# Patient Record
Sex: FEMALE | Race: WHITE | Employment: OTHER | URBAN - METROPOLITAN AREA
[De-identification: names, ages, dates, MRNs, and addresses within clinical notes are randomized per-mention and may not be internally consistent; named-entity substitution may affect disease eponyms.]

---

## 2021-12-21 NOTE — PROGRESS NOTES
HPI: Bora Ferguson (: 1955) is a 78 y.o. female, patient, here for evaluation of the following chief complaint(s): Patient seen today to evaluate her hands. He has developed catching, triggering and locking involving the left thumb over the last couple weeks. 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 She complains of some tingling in her fingers and toes but now is complaining of numbness, tingling and paresthesias that awaken her from her sleep. She underwent bilateral carpal tunnel releases with a left trigger thumb release on 2020. She has done well with that. Overall, she did well from the procedure but still has some mild palmar discomfort. She really does not experience significant nocturnal paresthesias. She does have a component of chronic neuropathy related to breast carcinoma chemotherapy treatment. She has developed catching and locking involving her right trigger thumb and also has a new cyst of the dorsal right hand in the extensor tendon moving with the middle finger. She underwent a right trigger thumb release and dorsal hand ganglion cyst excision from the [facility] tendon to the third finger on 10/12/2021. She presents today with complaint of induration at the ganglion cyst excision surgical site along with radiating discomfort to the forearm and right middle finger. Presents for reassurance that. She has been desensitizing the surgical wound. Hand Pain (Right)       Vitals:  Ht 5' 1\" (1.549 m)   Wt 185 lb (83.9 kg)   BMI 34.96 kg/m²    Body mass index is 34.96 kg/m². Allergies   Allergen Reactions    Morphine Itching       Current Outpatient Medications   Medication Sig    escitalopram oxalate (Lexapro) 10 mg tablet Take 10 mg by mouth daily.  buPROPion XL (WELLBUTRIN XL) 150 mg tablet Take 150 mg by mouth daily.  rosuvastatin (Crestor) 5 mg tablet Take  by mouth nightly.     multivitamin (ONE A DAY) tablet Take 1 Tablet by mouth daily.  cholecalciferol (Vitamin D3) 25 mcg (1,000 unit) cap Take  by mouth daily.  fish oil-omega-3 fatty acids (Fish OiL) 300-500 mg cap Take  by mouth. No current facility-administered medications for this visit. No past medical history on file. No past surgical history on file. No family history on file. Social History     Tobacco Use    Smoking status: Not on file    Smokeless tobacco: Not on file   Substance Use Topics    Alcohol use: Not on file    Drug use: Not on file        Review of Systems    Constitutional: No fevers, chills, night sweats, excessive fatigue or weight loss. Musculoskeletal: No joint pain, swelling or redness. No decreased range of motion. Neurologic: No headache, blurred vision, and no areas of focal weakness or numbness. Normal gait. No sensory problems. Respiratory: No dyspnea on exertion, orthopnea, chest pain, cough or hemoptysis. Cardiovascular: No anginal chest pain, irregular heart beat, tachycardia, palpitations or orthopnea  Integumentary: No chronic rashes, inflammation, ulcerations, pruritus, petechiae, purpura, ecchymoses, or skin changes           Physical Exam    General: Alert, cooperative, no distress  Musculosketal: Right hand - Normal range of motion. Normal sensation. Induration surrounding the surgical wound at the dorsal aspect of the right hand. No erythema or warmth. No sign of infection. Mildly tender. Question scar tissue versus the start of a suture eruption. Neurologic:  CNII-XII intact, Normal strength, sensation, and reflexes throughout    Imaging:  None     ASSESSMENT/PLAN:  Below is the assessment and plan developed based on review of pertinent history, physical exam, labs, studies, and medications. Patient seen today to evaluate her hands. He has developed catching, triggering and locking involving the left thumb over the last couple weeks.   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 She complains of some tingling in her fingers and toes but now is complaining of numbness, tingling and paresthesias that awaken her from her sleep. She underwent bilateral carpal tunnel releases with a left trigger thumb release on 11/9/2020. She has done well with that. Overall, she did well from the procedure but still has some mild palmar discomfort. She really does not experience significant nocturnal paresthesias. She does have a component of chronic neuropathy related to breast carcinoma chemotherapy treatment. She has developed catching and locking involving her right trigger thumb and also has a new cyst of the dorsal right hand in the extensor tendon moving with the middle finger. She underwent a right trigger thumb release and dorsal hand ganglion cyst excision from the [facility] tendon to the third finger on 10/12/2021. She presents today with complaint of induration at the surgical site along with radiating discomfort to the forearm and right middle finger. She presents for reassurance. She has been desensitizing the surgical wound. Patient was reassured the surgical wound is healing well and periodic twinges of discomfort normal at this juncture. The induration may be the start of an eruption of a suture. We discussed the signs and symptoms of a suture abscess. She will continue with gentle range of motion. She will follow-up in 4 weeks to review her progress. Return sooner as needed. 1. Trigger thumb of right hand  2. Right hand pain  3. S/P excision of ganglion cyst  4. Visit for wound check      Return in about 4 weeks (around 1/19/2022). Dr. Elvia Lindsey was available for immediate consult during this encounter. An electronic signature was used to authenticate this note.   -- Samir Brunson PA-C

## 2021-12-22 ENCOUNTER — OFFICE VISIT (OUTPATIENT)
Dept: ORTHOPEDIC SURGERY | Age: 66
End: 2021-12-22
Payer: MEDICARE

## 2021-12-22 VITALS — WEIGHT: 185 LBS | BODY MASS INDEX: 34.93 KG/M2 | HEIGHT: 61 IN

## 2021-12-22 DIAGNOSIS — Z98.890 S/P EXCISION OF GANGLION CYST: ICD-10-CM

## 2021-12-22 DIAGNOSIS — M79.641 RIGHT HAND PAIN: ICD-10-CM

## 2021-12-22 DIAGNOSIS — Z51.89 VISIT FOR WOUND CHECK: ICD-10-CM

## 2021-12-22 DIAGNOSIS — M65.311 TRIGGER THUMB OF RIGHT HAND: Primary | ICD-10-CM

## 2021-12-22 PROCEDURE — 99024 POSTOP FOLLOW-UP VISIT: CPT | Performed by: PHYSICIAN ASSISTANT

## 2021-12-22 RX ORDER — GLUCOSAMINE SULFATE 1500 MG
POWDER IN PACKET (EA) ORAL DAILY
COMMUNITY

## 2021-12-22 RX ORDER — BUPROPION HYDROCHLORIDE 150 MG/1
150 TABLET ORAL DAILY
COMMUNITY

## 2021-12-22 RX ORDER — BISMUTH SUBSALICYLATE 262 MG
1 TABLET,CHEWABLE ORAL DAILY
COMMUNITY

## 2021-12-22 RX ORDER — OMEGA-3 FATTY ACIDS/FISH OIL 300-500 MG
CAPSULE ORAL
COMMUNITY

## 2021-12-22 RX ORDER — ROSUVASTATIN CALCIUM 5 MG/1
TABLET, COATED ORAL
COMMUNITY

## 2021-12-22 RX ORDER — ESCITALOPRAM OXALATE 10 MG/1
10 TABLET ORAL DAILY
COMMUNITY

## 2022-03-18 PROBLEM — Z51.89 VISIT FOR WOUND CHECK: Status: ACTIVE | Noted: 2021-12-22

## 2022-03-19 PROBLEM — M79.641 RIGHT HAND PAIN: Status: ACTIVE | Noted: 2021-12-22

## 2022-03-19 PROBLEM — M65.311 TRIGGER THUMB OF RIGHT HAND: Status: ACTIVE | Noted: 2021-12-22

## 2022-03-19 PROBLEM — Z98.890 S/P EXCISION OF GANGLION CYST: Status: ACTIVE | Noted: 2021-12-22

## 2022-03-22 ENCOUNTER — TRANSCRIBE ORDER (OUTPATIENT)
Dept: SCHEDULING | Age: 67
End: 2022-03-22

## 2022-03-22 DIAGNOSIS — E04.2 NONTOXIC MULTINODULAR GOITER: Primary | ICD-10-CM

## 2022-03-22 DIAGNOSIS — Z13.820 SPECIAL SCREENING FOR OSTEOPOROSIS: ICD-10-CM

## 2022-09-09 ENCOUNTER — OFFICE VISIT (OUTPATIENT)
Dept: ORTHOPEDIC SURGERY | Age: 67
End: 2022-09-09
Payer: MEDICARE

## 2022-09-09 VITALS — BODY MASS INDEX: 33.61 KG/M2 | HEIGHT: 61 IN | WEIGHT: 178 LBS

## 2022-09-09 DIAGNOSIS — M17.11 PRIMARY OSTEOARTHRITIS OF RIGHT KNEE: ICD-10-CM

## 2022-09-09 DIAGNOSIS — M25.561 RIGHT KNEE PAIN, UNSPECIFIED CHRONICITY: Primary | ICD-10-CM

## 2022-09-09 PROCEDURE — G8400 PT W/DXA NO RESULTS DOC: HCPCS | Performed by: ORTHOPAEDIC SURGERY

## 2022-09-09 PROCEDURE — 3017F COLORECTAL CA SCREEN DOC REV: CPT | Performed by: ORTHOPAEDIC SURGERY

## 2022-09-09 PROCEDURE — G8427 DOCREV CUR MEDS BY ELIG CLIN: HCPCS | Performed by: ORTHOPAEDIC SURGERY

## 2022-09-09 PROCEDURE — G8536 NO DOC ELDER MAL SCRN: HCPCS | Performed by: ORTHOPAEDIC SURGERY

## 2022-09-09 PROCEDURE — G8432 DEP SCR NOT DOC, RNG: HCPCS | Performed by: ORTHOPAEDIC SURGERY

## 2022-09-09 PROCEDURE — G8417 CALC BMI ABV UP PARAM F/U: HCPCS | Performed by: ORTHOPAEDIC SURGERY

## 2022-09-09 PROCEDURE — 1090F PRES/ABSN URINE INCON ASSESS: CPT | Performed by: ORTHOPAEDIC SURGERY

## 2022-09-09 PROCEDURE — 1101F PT FALLS ASSESS-DOCD LE1/YR: CPT | Performed by: ORTHOPAEDIC SURGERY

## 2022-09-09 PROCEDURE — 99203 OFFICE O/P NEW LOW 30 MIN: CPT | Performed by: ORTHOPAEDIC SURGERY

## 2022-09-09 PROCEDURE — 1123F ACP DISCUSS/DSCN MKR DOCD: CPT | Performed by: ORTHOPAEDIC SURGERY

## 2022-09-09 NOTE — PROGRESS NOTES
Cinthya Wilkins (: 1955) is a 78 y.o. female, patient, here for evaluation of the following chief complaint(s):  Knee Pain (Right knee pain - received gel injection in the past (last injection 2022))       HPI:    If complaint is right knee pain. Patient is starting to have more limitations with her right knee. She been treated in the past with multiple hyaluronic acid injections they help her. She has had reconstruction of both knees. It is worsening and limitations are increasing. Allergies   Allergen Reactions    Morphine Itching       Current Outpatient Medications   Medication Sig    escitalopram oxalate (LEXAPRO) 10 mg tablet Take 10 mg by mouth daily. buPROPion XL (WELLBUTRIN XL) 150 mg tablet Take 150 mg by mouth daily. rosuvastatin (CRESTOR) 5 mg tablet Take  by mouth nightly. multivitamin (ONE A DAY) tablet Take 1 Tablet by mouth daily. cholecalciferol (VITAMIN D3) 25 mcg (1,000 unit) cap Take  by mouth daily. fish oil-omega-3 fatty acids (Fish OiL) 300-500 mg cap Take  by mouth. No current facility-administered medications for this visit.        Past Medical History:   Diagnosis Date    Arthritis 2009    Knees    Cancer ([facility] [address] , 2019    breast        Past Surgical History:   Procedure Laterality Date    HAND/FINGER SURGERY UNLISTED      HX ACL RECONSTRUCTION  ,     HX WRIST FRACTURE [place]  2019    WI ANESTH,SURGERY OF SHOULDER  2017    right rotator cuff    WI EXPLORATORY OF ABDOMEN  ,        Family History   Problem Relation Age of Onset    OSTEOARTHRITIS Mother     Diabetes Father     Stroke Father         Social History     Socioeconomic History    Marital status:      Spouse name: Not on file    Number of children: Not on file    Years of education: Not on file    Highest education level: Not on file   Occupational History    Not on file   Tobacco Use    Smoking status: Never     Passive exposure: Past    Smokeless tobacco: Never Substance and Sexual Activity    Alcohol use: Yes     Alcohol/week: 4.0 standard drinks     Types: 2 Glasses of wine, 2 Shots of liquor per week    Drug use: Never    Sexual activity: Yes     Partners: Male     Birth control/protection: None   Other Topics Concern    Not on file   Social History Narrative    Not on file     Social Determinants of Health     Financial Resource Strain: Not on file   Food Insecurity: Not on file   Transportation Needs: Not on file   Physical Activity: Not on file   Stress: Not on file   Social Connections: Not on file   Intimate Partner Violence: Not on file   Housing Stability: Not on file       ROS    Positive for: Musculoskeletal  Last edited by Jhonny Shepard on 9/9/2022  3:14 PM.            Vitals:  Ht 5' 1\" (1.549 m)   Wt 178 lb (80.7 kg)   BMI 33.63 kg/m²    Body mass index is 33.63 kg/m². PHYSICAL EXAM:  Exam of right knee. She has valgus malalignment. Right knee range of motion 0 to 120 degrees. No instability is noted. IMAGING:  XR Results (most recent):  Results from Appointment encounter on 09/09/22    XR KNEE RT 3 V    Narrative  3 views right knee. Bone-on-bone lateral compartment. Evidence of old ACL tunnels. No interference screws are noted. ASSESSMENT/PLAN:  1. Right knee pain, unspecified chronicity  -     XR KNEE RT 3 V; Future  2. Primary osteoarthritis of right knee    Discussed options. I am going to get her another hyaluronic acid injection. She is contemplating surgery. Not unreasonable due to the fact that she has bone-on-bone stage IV osteoarthritis both knees. All questions were answered. She can call back to schedule. Would like her to get her injection from [facility]  next week if it becomes available. An electronic signature was used to authenticate this note.   --Frank Fields MD

## 2022-09-13 DIAGNOSIS — M17.11 PRIMARY OSTEOARTHRITIS OF RIGHT KNEE: Primary | ICD-10-CM

## 2022-09-13 RX ORDER — SODIUM HYALURONATE INTRA-ARTICULAR SOLN PREF SYR 25 MG/2.5ML 25/2.5 MG/ML
SOLUTION PREFILLED SYRINGE INTRAARTICULAR
Qty: 3 EACH | Refills: 0 | Status: SHIPPED | OUTPATIENT
Start: 2022-09-13

## 2022-12-06 LAB
HBA1C MFR BLD HPLC: 5.6 %
LDL CHOLESTEROL, EXTERNAL: 79

## 2023-01-13 ENCOUNTER — OFFICE VISIT (OUTPATIENT)
Dept: NEUROLOGY | Age: 68
End: 2023-01-13
Payer: MEDICARE

## 2023-01-13 VITALS
BODY MASS INDEX: 35.87 KG/M2 | HEIGHT: 61 IN | DIASTOLIC BLOOD PRESSURE: 72 MMHG | OXYGEN SATURATION: 97 % | RESPIRATION RATE: 16 BRPM | SYSTOLIC BLOOD PRESSURE: 142 MMHG | HEART RATE: 71 BPM | WEIGHT: 190 LBS

## 2023-01-13 DIAGNOSIS — R41.0 CONFUSION: ICD-10-CM

## 2023-01-13 DIAGNOSIS — R41.3 MEMORY DIFFICULTY: Primary | ICD-10-CM

## 2023-01-13 DIAGNOSIS — I65.23 BILATERAL CAROTID ARTERY STENOSIS: ICD-10-CM

## 2023-01-13 NOTE — ADDENDUM NOTE
Addended by: Soni Lowery on: 1/13/2023 03:14 PM     Modules accepted: Level of Service Internal Medicine

## 2023-01-13 NOTE — PROGRESS NOTES
[facility] and  [address] at [facility] at [address] [facility], [address] [facility] [address] [facility], [address] [place] [address]  [phone] Office  [phone]           Referring: Angel Jacobson, [address],  [address] [facility]     Chief Complaint   Patient presents with    New Patient    Memory Loss     Difficulty recalling conversations periodically, little memory lapses first noticed about 2 yrs ago. Coordination seems to be worsening     77-year-old lady who presents today companied by her  for initial neurologic consultation regarding progressive cognitive difficulty. She actually had neuropsych testing done back in  with Dr. Clarence Haque and she was kind enough to bring that evaluation. That has been reviewed today and was unremarkable. She notes that she has difficulty with recalling left and right. She does not have real difficulty with remembering how to get places but for example the GPS was telling her to go one way and she was looking the other way. She does not feel like she is as sharp as she should be. It takes her longer to remember things. She forgets conversations at times. She has difficulty recalling events that have happened in the past and has to be prompted in order to remember family events and vacations. Her  concurs that she is just not as sharp and quick as she used to be. Her mother had dementia towards the end of her life and she  at the age of 80. She also had underlying mental illness not diagnosed. The patient has had breast cancer x2 and had chemotherapy as part of that treatment with her chemotherapy ending 2019. She has maybe 1 or 2 glasses of alcohol 3 times a week. She does not drink 2 days in a row. No focal deficits.   She does recount having an MRI done in  and brings that report Linda Myers performed and these include  December 8, 2022  Homocystine normal  B12 normal at 504  Folic acid normal  Hemoglobin A1c 5.6  TSH normal  Total T3 normal  Free T3 normal  T4 normal  Free T4 normal  Metabolic panel normal  Lipid panel with an LDL of 79  Vitamin D 44  CBC normal  Iron studies normal  Reverse T3 normal    Record review finds an MRI of the brain done September 2, 2021 at [facility] by report this shows normal age-related changes    Past Medical History:   Diagnosis Date    Arthritis 2009    Knees    Cancer ([address] 2002, 2019    breast    Hearing reduced hearing aids 1/2/2023       Past Surgical History:   Procedure Laterality Date    HX ACL RECONSTRUCTION  1991, 2001    HX WRIST FRACTURE TX  2019    TN ANES NRV MUSC TNDN FSCIA BURSA SHOULDER & AXILLA  2017    right rotator cuff    [place] EXPLORATORY LAPAROTOMY CELIOTOMY W/WO BIOPSY SPX  1991, 2007    [place] UNLISTED PROCEDURE HANDS/FINGERS  2020       Current Outpatient Medications   Medication Sig Dispense Refill    escitalopram oxalate (LEXAPRO) 10 mg tablet Take 10 mg by mouth daily. buPROPion XL (WELLBUTRIN XL) 150 mg tablet Take 150 mg by mouth daily. rosuvastatin (CRESTOR) 5 mg tablet Take  by mouth nightly. multivitamin (ONE A DAY) tablet Take 1 Tablet by mouth daily. cholecalciferol (VITAMIN D3) 25 mcg (1,000 unit) cap Take  by mouth daily. fish oil-omega-3 fatty acids (Fish OiL) 300-500 mg cap Take  by mouth.      sodium hyaluronate (Supartz FX) 10 mg/mL syrg injection Inject in right knee once a week for three weeks (Patient not taking: Reported on 1/13/2023) 3 Each 0        Allergies   Allergen Reactions    Morphine Itching       Social History     Tobacco Use    Smoking status: Never     Passive exposure: Past    Smokeless tobacco: Never   Vaping Use    Vaping Use: Never used   Substance Use Topics    Alcohol use:  Yes     Alcohol/week: 4.0 standard drinks     Types: 2 Glasses of wine, 2 Shots of liquor per week    Drug use: Never Family History   Problem Relation Age of Onset    OSTEOARTHRITIS Mother     Dementia Mother     Diabetes Father     Stroke Father     Breast Cancer Sister         breast cancer       Review of Systems  Pertinent positives and negatives as noted. Examination  Visit Vitals  BP (!) 142/72 (BP 1 Location: Right arm, BP Patient Position: Sitting, BP Cuff Size: Large adult)   Pulse 71   Resp 16   Ht 5' 1\" (1.549 m)   Wt 86.2 kg (190 lb)   SpO2 97%   BMI 35.90 kg/m²     She is a pleasant well-appearing lady who is awake alert oriented and discusses her medical history and current events. Registration 3/3 with recall 3/3. She spells the word world forward and backward. She does serial sevens 100, 93, 86, 79, 72, 65. Follows all commands. Cranial nerves intact 2-12. She does not have any nystagmus. She has no pronation drift or abnormal movement. She was this fully in the upper and lower extremities in all muscle groups to direct testing. Reflexes symmetrical upper and lower. No ataxia    Impression/Plan  69-year-old lady with progressive memory difficulty/confusion and differential diagnosis certainly includes age-related cognitive decline versus mild cognitive impairment versus early dementing process versus processing issue versus attention versus emotional versus metabolic, structural versus vascular versus other  No need to repeat imaging as she just had that done as above  Laboratory markers have been checked so defer  EEG  Carotid Doppler  Formal neurocognitive eval  Follow-up after    Kirk Mccain MD          This note was created using voice recognition software. Despite editing, there may be syntax errors.

## 2023-01-13 NOTE — PATIENT INSTRUCTIONS
[facility] Test Result Communication    Test results are available in [address] [employer] is the patient portal into our electronic health record. This feature allows patients to see diagnostic test results, immunizations, allergies, past medical and surgical history, current medications, and send messages directly to providers. Our team members at the  can provide additional information and assist with registration. The [employer] support team can be reached at [phone]. In some cases, a provider might need time to explain the results in detail during a follow-up appointment. This might include additional information or context that will help patients understand the reason for next steps in the plan of care recommended by their provider. If a patient chooses to receive diagnostic testing at an imaging center outside of the [facility], it is the patient's responsibility to bring the imaging report and disc to their [facility] follow-up appointment. If the test results reveal anything that is particularly noteworthy, we will contact you to discuss the matter and, if necessary, schedule a follow-up appointment at an earlier date. If you have not received your test results by [employer] or other communication within 7 days, please contact our office. An inquiry can be sent to your provider using [employer]. Alternatively, appointments can be scheduled via telephone to review results. If a follow-up appointment to review results has not been scheduled, [address] office can be reached at [phone]. For appointments at our [facility] or [facility] office, please call [phone].        [address] [facility]   Statement to Patients  April 1, 2014      In an effort to ensure the large volume of patient prescription refills is processed in the most efficient and expeditious manner, we are asking our patients to assist us by calling your Pharmacy for all prescription refills, this will include also your  Mail Order Pharmacy. The pharmacy will contact our office electronically to continue the refill process. Please do not wait until the last minute to call your pharmacy. We need at least 48 hours (2days) to fill prescriptions. We also encourage you to call your pharmacy before going to  your prescription to make sure it is ready. With regard to controlled substance prescription refill requests (narcotic refills) that need to be picked up at our office, we ask your cooperation by providing us with at least 72 hours (3days) notice that you will need a refill. We will not refill narcotic prescription refill requests after 4:00pm on any weekday, Monday through Thursday, or after 2:00pm on Fridays, or on the weekends. We encourage everyone to explore another way of getting your prescription refill request processed using [employer], our patient web portal through our electronic medical record system. [employer] is an efficient and effective way to communicate your medication request directly to the office and  downloadable as an chemo on your smart phone . [employer] also features a review functionality that allows you to view your medication list as well as leave messages for your physician. Are you ready to get connected? If so please review the attatched instructions or speak to any of our staff to get you set up right away! Thank you so much for your cooperation. Should you have any questions please contact our Practice Administrator.

## 2023-01-17 ENCOUNTER — TELEPHONE (OUTPATIENT)
Dept: NEUROLOGY | Age: 68
End: 2023-01-17

## 2023-03-21 ENCOUNTER — OFFICE VISIT (OUTPATIENT)
Dept: NEUROLOGY | Age: 68
End: 2023-03-21

## 2023-03-21 DIAGNOSIS — R41.0 CONFUSION: Primary | ICD-10-CM

## 2023-03-26 NOTE — PROCEDURES
[facility] AT [place]   Electroencephalogram Report    Procedure ID: [id number] Procedure Date: 3/21/2023   Patient Name: Debbie Iglesias YOB: 1955   Procedure Type: Routine Medical Record No: [id number]       An EEG is requested in this 59-year-old lady to evaluate for epileptiform abnormalities. Medications said to include Lexapro, Wellbutrin, Crestor and Glucophage    This tracing is obtained during the awake state. During wakefulness the background consists of diffuse low voltage fast frequency beta wave activity. Hyperventilation is not performed. Intermittent photic stimulation little alters titration. Sleep is not attained.     Interpretation  This EEG recorded during the awake state is normal.        David Elias MD

## 2023-05-20 RX ORDER — ROSUVASTATIN CALCIUM 5 MG/1
TABLET, COATED ORAL
COMMUNITY

## 2023-05-20 RX ORDER — BUPROPION HYDROCHLORIDE 150 MG/1
150 TABLET ORAL DAILY
COMMUNITY

## 2023-05-20 RX ORDER — ESCITALOPRAM OXALATE 10 MG/1
10 TABLET ORAL DAILY
COMMUNITY

## 2023-09-15 ENCOUNTER — OFFICE VISIT (OUTPATIENT)
Age: 68
End: 2023-09-15

## 2023-09-15 DIAGNOSIS — F41.1 GENERALIZED ANXIETY DISORDER: ICD-10-CM

## 2023-09-15 DIAGNOSIS — R41.840 ATTENTION DEFICIT: Primary | ICD-10-CM

## 2023-09-15 DIAGNOSIS — F33.41 RECURRENT MAJOR DEPRESSIVE DISORDER, IN PARTIAL REMISSION (HCC): ICD-10-CM

## 2023-09-15 NOTE — PROGRESS NOTES
alcoholic beverages approximately 3 days/week. Denied history of heavy or consistent consumption  Nicotine: Denied  Recreational/Illicit Substances: Denied  Treatment: Denied    BEHAVIORAL OBSERVATIONS:  Appearance: Casually dressed, Well-groomed, and Appeared stated age  Orientation: Person, Place, Time, and Situation. Able to provide specific details related to recent events. Motor: Ambulated independently, Adequate gait, Adequate posture, No involuntary movements, and Adequate strength  Thought Processes: Clear, Coherent, Intact, Logical, and Organized  Hearing and Vision: Adequate  Speech: Appropriate for Rate, Tone, Prosody, and Volume  Comprehension: Adequate  Interpersonal Skills: Adequate  Affect: Appropriate  Ability as Historian: Adequate  Insight: Adequate  Judgment: Adequate    STRENGTHS:  Exercising self-direction/Resourceful, Access to housing/residential stability, and Interpersonal/supportive relationships (family, friends, peers)    WEAKNESSES:  Health problems and Cognitive limitations    IMPRESSION:  Patient is a pleasant 68-year-old woman who presents with concerns regarding her cognitive functioning. Based only on clinical interview, I suspect her cognitive functioning to be appropriate for aging, though there may be mild inefficiencies from depression and anxiety. Comprehensive evaluation will assist with obtaining a quantitative assessment of the patient's cognitive emotional functioning in order to guide differential diagnosis and treatment planning.     ASSESSMENT:  Attention deficit  Major depressive disorder in partial remission  Generalized anxiety disorder    PLAN:  Patient will participate in comprehensive evaluation in order to obtain a quantitative assessment of their cognitive and emotional functioning  Differential diagnosis and treatment planning will be based upon results from clinical interview and objective testing  Patient will be provided with review of results,

## 2023-09-16 ENCOUNTER — TELEPHONE (OUTPATIENT)
Age: 68
End: 2023-09-16

## 2023-09-16 NOTE — TELEPHONE ENCOUNTER
Per [facility] Provider Portal no PA is needed for [id number], [id number], [id number], [id number], [id number]    Reference Number: [id number]

## 2023-09-28 ENCOUNTER — PROCEDURE VISIT (OUTPATIENT)
Age: 68
End: 2023-09-28
Payer: MEDICARE

## 2023-09-28 DIAGNOSIS — R41.840 ATTENTION DEFICIT: ICD-10-CM

## 2023-09-28 DIAGNOSIS — F41.1 GENERALIZED ANXIETY DISORDER: Primary | ICD-10-CM

## 2023-09-28 PROCEDURE — 96139 PSYCL/NRPSYC TST TECH EA: CPT | Performed by: CLINICAL NEUROPSYCHOLOGIST

## 2023-09-28 PROCEDURE — 96138 PSYCL/NRPSYC TECH 1ST: CPT | Performed by: CLINICAL NEUROPSYCHOLOGIST

## 2023-09-28 PROCEDURE — 96136 PSYCL/NRPSYC TST PHY/QHP 1ST: CPT | Performed by: CLINICAL NEUROPSYCHOLOGIST

## 2023-10-09 NOTE — PROGRESS NOTES
previous evaluation, qualitative comparison did not reveal any appreciable changes. Regarding her question related to ADHD, her self report of symptoms was within normal limits, not suggestive of clinically significant elevations. ASSESSMENT:  Generalized anxiety disorder  Attention deficit - NOT SUPPORTED    RECOMMENDATIONS:  The patient currently has a feedback session scheduled for 10/10/2023. During this appointment, the results of the evaluation will be reviewed, recommendations will be offered (see below), and the patient will be provided with the opportunity to ask questions. The patient will be encouraged to review the results of this evaluation with her providers and discuss potential implications for treatment. At this time, the patient's neuropsychological evaluation suggests she does maintain capacity to make decisions. If not already addressed, the patient and her family are encouraged to discuss legal issues such as power of  to assist the patient in future financial and healthcare decisions. Information regarding these issues can be found at www.caringinfo. org or www.agingwithdignity.org/5wishes.html  Interview and testing revealed clinically significant symptoms of psychopathology. The patient would likely benefit from initiating counseling/psychotherapy services. There are several resources for finding a therapist, including the patient's insurance company but also the [facility] [place] Psychologist  (. apa.org), therapist  at psychology today (therapists. psychologytoday. com), and the American Board of Professional Psychology (ABPP.org). The patient may find that it is helpful to use practical compensatory strategies to reduce the impact of cognitive difficulties. Examples of such strategies include:  Keep a steady routine environment and using visual cues, such as whiteboards, to help provide reminders of tasks.    Take additional time

## 2023-10-10 ENCOUNTER — OFFICE VISIT (OUTPATIENT)
Age: 68
End: 2023-10-10
Payer: MEDICARE

## 2023-10-10 DIAGNOSIS — F41.1 GENERALIZED ANXIETY DISORDER: Primary | ICD-10-CM

## 2023-10-10 PROCEDURE — 96133 NRPSYC TST EVAL PHYS/QHP EA: CPT | Performed by: CLINICAL NEUROPSYCHOLOGIST

## 2023-10-10 PROCEDURE — 96132 NRPSYC TST EVAL PHYS/QHP 1ST: CPT | Performed by: CLINICAL NEUROPSYCHOLOGIST

## 2023-10-10 NOTE — PROGRESS NOTES
Prior to seeing the patient I reviewed pertinent records, including the previously completed report, the records in [address] [place] [address], and any updated visits from other providers since the patient's last visit. Today, I engaged in a psychoeducational and supportive feedback session with the patient. I provided psychotherapy in the form of psychoeducation and support with respect to the results of the recent Neuropsychological Evaluation, including discussing individual tests as well as patient's areas of neurocognitive strength versus weakness. We discussed, in detail, the following: The patient's cognitive testing was within normal limits for age and it was commensurate with expectations of her higher than average premorbid functioning. Testing only revealed mild symptoms of anxiety  Reviewed recommendations outlined in report  Answered questions to the best of my ability    Education was provided regarding my diagnostic impressions, and we discussed treatment plan/options. I also answered numerous questions related to the clinical findings, including discussing various methods to improve cognition and mood. Supportive/Cognitive Behavioral/Solution Focused psychotherapy provided. The patient needs to:    Follow-up with referring provider for ongoing care  If not already working with a therapist, the patient is recommended to establish care for the treatment of her anxiety  Emphasize modifiable protective behaviors for cognitive functioning such as exercise, diet, and cognitive stimulation    TIME:  Clinical Interview: 0900 - 0920 = 20 minutes  Testing and scoring by provider: 16 minutes  Testing by technician: 3357  [phone] = 154 minutes  Scoring by technician: 43 minutes  Neuropsychological testing evaluation services*: 139 minutes + 20 minutes feedback = 159 minutes total    BILLING:  89873 x 1 Unit  96136 x 1 Unit  96138 x 1 Unit  96139 x 6 Units  96132 x 1 Unit  96133 x 2 Units    *Neuropsychological testing

## 2024-01-31 ENCOUNTER — TELEPHONE (OUTPATIENT)
Age: 69
End: 2024-01-31

## 2024-01-31 NOTE — TELEPHONE ENCOUNTER
Spoke with pt, emailed the release of medical records form. Pt stated that she will call her dr to ask why dr's office marked it urgent for the records. We had to send release form due to the dr Adame is not on her provider list.

## 2024-08-26 ENCOUNTER — HOSPITAL ENCOUNTER (OUTPATIENT)
Age: 69
Discharge: HOME | End: 2024-08-29

## 2024-08-26 DIAGNOSIS — I25.10 ATHEROSCLEROSIS OF NATIVE CORONARY ARTERY WITHOUT ANGINA PECTORIS, UNSPECIFIED WHETHER NATIVE OR TRANSPLANTED HEART: ICD-10-CM

## 2024-08-26 PROCEDURE — 75571 CT HRT W/O DYE W/CA TEST: CPT

## 2024-09-12 ENCOUNTER — TELEPHONE (OUTPATIENT)
Age: 69
End: 2024-09-12

## 2024-09-12 NOTE — TELEPHONE ENCOUNTER
Called patient to inform her testing appt has been cancelled due to psychometrist ooo. LM requesting a call back to discuss.

## 2024-10-10 ENCOUNTER — OFFICE VISIT (OUTPATIENT)
Age: 69
End: 2024-10-10
Payer: MEDICARE

## 2024-10-10 DIAGNOSIS — F41.1 GENERALIZED ANXIETY DISORDER: Primary | ICD-10-CM

## 2024-10-10 PROCEDURE — 90791 PSYCH DIAGNOSTIC EVALUATION: CPT | Performed by: CLINICAL NEUROPSYCHOLOGIST

## 2024-10-10 PROCEDURE — 1123F ACP DISCUSS/DSCN MKR DOCD: CPT | Performed by: CLINICAL NEUROPSYCHOLOGIST

## 2024-10-10 NOTE — PROGRESS NOTES
changes.\"  EEG: Obtained 3/21/2023; interpreted as \"normal.\"    Pertinent Labs:  Pertinent labs (TSH, T4 free, B12, folate, vitamin D, and A1c) were last obtained 12/8/2022.  They were within their respective reference ranges.    PSYCHOSOCIAL HISTORY:  Birth/Development: Born and raised in [place], [place]  Language: English  Education: Completed law school.  Later returned to [employer] and earned her masters of Divinity.  Academic Problems: Denied  Occupation: Initially worked as an  and later became a .  Retired in 2017.   Service: None  Relationship Status:  30 years  Children: 2 children through marriage  Housing: Private residence with   Legal: Denied.   has POA     Substance Use:  Alcohol: Reduced her alcohol consumption since our previous appointment.  Currently consumes 1-2 alcoholic beverages approximately 1 day/week.    Nicotine: Denied  Recreational/Illicit Substances: Denied  Treatment: Denied    BEHAVIORAL OBSERVATIONS:  Appearance: Casually dressed, Well-groomed, and Appeared stated age  Orientation: Person, Place, Time, and Situation  Motor: Ambulated independently, Adequate gait, Adequate posture, and No involuntary movements  Thought Processes: Clear, Coherent, Intact, Logical, and Organized  Hearing and Vision: Adequate  Speech: Appropriate for Rate, Tone, Prosody, and Volume  Comprehension: Adequate  Interpersonal Skills: Adequate  Affect: Appropriate  Ability as Historian: Adequate  Insight: Adequate  Judgment: Adequate    STRENGTHS:  Exercising self-direction/Resourceful, Access to housing/residential stability, and Interpersonal/supportive relationships (family, friends, peers)    WEAKNESSES:  Relationship stress    IMPRESSION:  The patient is a pleasant 68-year-old woman who presents for serial neuropsychological consultation due to previous concerns regarding cognitive functioning.  She does not have concerns at this time and clinically she presents